# Patient Record
Sex: FEMALE | Race: WHITE | NOT HISPANIC OR LATINO | URBAN - METROPOLITAN AREA
[De-identification: names, ages, dates, MRNs, and addresses within clinical notes are randomized per-mention and may not be internally consistent; named-entity substitution may affect disease eponyms.]

---

## 2021-08-24 ENCOUNTER — OFFICE VISIT (OUTPATIENT)
Dept: URBAN - METROPOLITAN AREA CLINIC 92 | Facility: CLINIC | Age: 26
End: 2021-08-24
Payer: COMMERCIAL

## 2021-08-24 ENCOUNTER — DASHBOARD ENCOUNTERS (OUTPATIENT)
Age: 26
End: 2021-08-24

## 2021-08-24 VITALS
DIASTOLIC BLOOD PRESSURE: 73 MMHG | WEIGHT: 146.4 LBS | TEMPERATURE: 98.8 F | SYSTOLIC BLOOD PRESSURE: 111 MMHG | BODY MASS INDEX: 22.98 KG/M2 | HEIGHT: 67 IN | HEART RATE: 59 BPM

## 2021-08-24 DIAGNOSIS — K58.1 IRRITABLE BOWEL SYNDROME WITH CONSTIPATION: ICD-10-CM

## 2021-08-24 DIAGNOSIS — R53.83 FATIGUE, UNSPECIFIED TYPE: ICD-10-CM

## 2021-08-24 DIAGNOSIS — K59.04 CHRONIC IDIOPATHIC CONSTIPATION: ICD-10-CM

## 2021-08-24 PROBLEM — 440630006: Status: ACTIVE | Noted: 2021-08-24

## 2021-08-24 PROBLEM — 82934008: Status: ACTIVE | Noted: 2021-08-23

## 2021-08-24 PROCEDURE — 99204 OFFICE O/P NEW MOD 45 MIN: CPT | Performed by: INTERNAL MEDICINE

## 2021-08-24 RX ORDER — LINACLOTIDE 145 UG/1
1 CAPSULE AT LEAST 30 MINUTES BEFORE THE FIRST MEAL OF THE DAY ON AN EMPTY STOMACH CAPSULE, GELATIN COATED ORAL ONCE A DAY
Qty: 30 | Refills: 4 | OUTPATIENT
Start: 2021-08-24 | End: 2022-01-20

## 2021-08-24 NOTE — HPI-TODAY'S VISIT:
27 yo female referred for a GI consultation of [facility] constipation and a copy of this note will be sent to the referring physician. Pt has tried trulance and colon cleanse for her chr constipation. Pt has had constipation since [facility]- saw a GI in [place] and she had egd/colonoscopy/bloodwork neg. Pt was tried antacids and miralax, citrucel nothing worked. Pt then saw a holistic practitioner and did elimination tests and she was told she had gluten intolerance. Pt said eliminating gluten helped for a few years. Pt was then going every week. Pt more recently has issues. 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 SInce covid she had more stress. Pt saw Dr Luciana Jose and she was going 3 wks without a BM so she gave her trulance to try but it has not worked. 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

## 2021-10-08 ENCOUNTER — OFFICE VISIT (OUTPATIENT)
Dept: URBAN - METROPOLITAN AREA TELEHEALTH 2 | Facility: TELEHEALTH | Age: 26
End: 2021-10-08

## 2021-10-14 ENCOUNTER — OFFICE VISIT (OUTPATIENT)
Dept: URBAN - METROPOLITAN AREA TELEHEALTH 2 | Facility: TELEHEALTH | Age: 26
End: 2021-10-14